# Patient Record
Sex: MALE | URBAN - METROPOLITAN AREA
[De-identification: names, ages, dates, MRNs, and addresses within clinical notes are randomized per-mention and may not be internally consistent; named-entity substitution may affect disease eponyms.]

---
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
## 2024-05-05 NOTE — ED PROVIDER NOTE - INTERPRETATION
Pa needed for wegovy 0.5mg and 1mg  Cmm key: [id number]   Cmm key: [id number]    NEED TO ENTER    WILL TRY IN EPIC   
Wegovy 0.5mg approved  Authorized from April 5, 2024 to December 1, 2024  Information received electronically from payer    1mg dose pa still pending  
abnormal